# Patient Record
Sex: FEMALE | Race: BLACK OR AFRICAN AMERICAN | NOT HISPANIC OR LATINO | URBAN - METROPOLITAN AREA
[De-identification: names, ages, dates, MRNs, and addresses within clinical notes are randomized per-mention and may not be internally consistent; named-entity substitution may affect disease eponyms.]

---

## 2017-08-01 ENCOUNTER — EMERGENCY (EMERGENCY)
Facility: HOSPITAL | Age: 57
LOS: 1 days | Discharge: HOME | End: 2017-08-01
Attending: EMERGENCY MEDICINE | Admitting: EMERGENCY MEDICINE
Payer: SELF-PAY

## 2017-08-01 VITALS
TEMPERATURE: 100 F | SYSTOLIC BLOOD PRESSURE: 160 MMHG | RESPIRATION RATE: 18 BRPM | WEIGHT: 190.04 LBS | OXYGEN SATURATION: 97 % | HEIGHT: 60 IN | HEART RATE: 82 BPM | DIASTOLIC BLOOD PRESSURE: 85 MMHG

## 2017-08-01 VITALS
HEART RATE: 78 BPM | OXYGEN SATURATION: 98 % | TEMPERATURE: 98 F | DIASTOLIC BLOOD PRESSURE: 75 MMHG | RESPIRATION RATE: 16 BRPM | SYSTOLIC BLOOD PRESSURE: 150 MMHG

## 2017-08-01 PROCEDURE — 99283 EMERGENCY DEPT VISIT LOW MDM: CPT | Mod: 25

## 2017-08-01 PROCEDURE — 16020 DRESS/DEBRID P-THICK BURN S: CPT

## 2017-08-01 RX ORDER — ACETAMINOPHEN 500 MG
650 TABLET ORAL ONCE
Qty: 0 | Refills: 0 | Status: COMPLETED | OUTPATIENT
Start: 2017-08-01 | End: 2017-08-01

## 2017-08-01 RX ADMIN — Medication 1 APPLICATION(S): at 11:40

## 2017-08-01 RX ADMIN — Medication 650 MILLIGRAM(S): at 11:40
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
## 2018-03-28 NOTE — ED ADULT NURSE NOTE - MUSCULOSKELETAL WDL
Full range of motion of upper and lower extremities, no joint tenderness/swelling.
1 person assist/verbal cues/nonverbal cues (demo/gestures)

## 2018-12-20 ENCOUNTER — EMERGENCY (EMERGENCY)
Facility: HOSPITAL | Age: 58
LOS: 1 days | Discharge: HOME | End: 2018-12-20
Attending: EMERGENCY MEDICINE | Admitting: EMERGENCY MEDICINE
Payer: SELF-PAY

## 2018-12-20 VITALS
HEART RATE: 78 BPM | RESPIRATION RATE: 16 BRPM | OXYGEN SATURATION: 95 % | TEMPERATURE: 98 F | DIASTOLIC BLOOD PRESSURE: 81 MMHG | SYSTOLIC BLOOD PRESSURE: 174 MMHG

## 2018-12-20 VITALS
SYSTOLIC BLOOD PRESSURE: 150 MMHG | RESPIRATION RATE: 16 BRPM | DIASTOLIC BLOOD PRESSURE: 92 MMHG | HEIGHT: 60 IN | WEIGHT: 220.02 LBS | HEART RATE: 78 BPM | TEMPERATURE: 98 F | OXYGEN SATURATION: 99 %

## 2018-12-20 PROCEDURE — 72100 X-RAY EXAM L-S SPINE 2/3 VWS: CPT | Mod: 26

## 2018-12-20 PROCEDURE — 72100 X-RAY EXAM L-S SPINE 2/3 VWS: CPT

## 2018-12-20 PROCEDURE — 96372 THER/PROPH/DIAG INJ SC/IM: CPT

## 2018-12-20 PROCEDURE — 99283 EMERGENCY DEPT VISIT LOW MDM: CPT

## 2018-12-20 PROCEDURE — 99283 EMERGENCY DEPT VISIT LOW MDM: CPT | Mod: 25

## 2018-12-20 RX ORDER — KETOROLAC TROMETHAMINE 30 MG/ML
1 SYRINGE (ML) INJECTION
Qty: 20 | Refills: 0 | OUTPATIENT
Start: 2018-12-20 | End: 2018-12-24

## 2018-12-20 RX ORDER — KETOROLAC TROMETHAMINE 30 MG/ML
30 SYRINGE (ML) INJECTION ONCE
Qty: 0 | Refills: 0 | Status: DISCONTINUED | OUTPATIENT
Start: 2018-12-20 | End: 2018-12-20

## 2018-12-20 RX ADMIN — Medication 30 MILLIGRAM(S): at 11:15

## 2018-12-20 RX ADMIN — Medication 30 MILLIGRAM(S): at 11:45

## 2018-12-20 NOTE — ED PROVIDER NOTE - OBJECTIVE STATEMENT
59 y/o F pt w/ no significant PMHx presents to the ED ambulatory c/o back pain radiating to L leg x 3 months. Pt denies hx of injury or fall. Reports pain increased with ambulation and standing. Pt denies bladder or bowel dysfunction, numbness, tingling or any other complaints at this time.

## 2021-05-24 NOTE — ED ADULT TRIAGE NOTE - AS HEIGHT TYPE
Pt called in, said Larry wouldn't let her reply, said she did go to  over the weekend but that the white around the cut was because she got it wet. She removed the bandage to let it air dry and it is looking much better. She will call back if any concerns arise.    stated

## 2022-02-08 NOTE — ED ADULT TRIAGE NOTE - HEIGHT IN CM
Education Record    Learner:  Patient    Disease / Diagnosis: Vit b12 deficiency    Barriers / Limitations:  None   Comments:    Method:  Brief focused   Comments:    General Topics:  Plan of care reviewed   Comments:    Outcome:  Shows understanding   Comments: 152.4

## 2022-07-03 ENCOUNTER — EMERGENCY (EMERGENCY)
Facility: HOSPITAL | Age: 62
LOS: 1 days | Discharge: HOME | End: 2022-07-03
Attending: STUDENT IN AN ORGANIZED HEALTH CARE EDUCATION/TRAINING PROGRAM | Admitting: STUDENT IN AN ORGANIZED HEALTH CARE EDUCATION/TRAINING PROGRAM
Payer: COMMERCIAL

## 2022-07-03 VITALS
SYSTOLIC BLOOD PRESSURE: 166 MMHG | DIASTOLIC BLOOD PRESSURE: 79 MMHG | HEART RATE: 76 BPM | OXYGEN SATURATION: 99 % | TEMPERATURE: 98 F | RESPIRATION RATE: 16 BRPM

## 2022-07-03 VITALS
SYSTOLIC BLOOD PRESSURE: 177 MMHG | WEIGHT: 195.11 LBS | RESPIRATION RATE: 18 BRPM | OXYGEN SATURATION: 97 % | HEIGHT: 60 IN | DIASTOLIC BLOOD PRESSURE: 81 MMHG | TEMPERATURE: 98 F | HEART RATE: 78 BPM

## 2022-07-03 PROCEDURE — 99285 EMERGENCY DEPT VISIT HI MDM: CPT | Mod: 25

## 2022-07-03 PROCEDURE — 90471 IMMUNIZATION ADMIN: CPT

## 2022-07-03 PROCEDURE — 90715 TDAP VACCINE 7 YRS/> IM: CPT

## 2022-07-03 PROCEDURE — 99283 EMERGENCY DEPT VISIT LOW MDM: CPT

## 2022-07-03 RX ORDER — TETANUS TOXOID, REDUCED DIPHTHERIA TOXOID AND ACELLULAR PERTUSSIS VACCINE, ADSORBED 5; 2.5; 8; 8; 2.5 [IU]/.5ML; [IU]/.5ML; UG/.5ML; UG/.5ML; UG/.5ML
0.5 SUSPENSION INTRAMUSCULAR ONCE
Refills: 0 | Status: COMPLETED | OUTPATIENT
Start: 2022-07-03 | End: 2022-07-03

## 2022-07-03 RX ADMIN — Medication 1 APPLICATION(S): at 21:22

## 2022-07-03 RX ADMIN — TETANUS TOXOID, REDUCED DIPHTHERIA TOXOID AND ACELLULAR PERTUSSIS VACCINE, ADSORBED 0.5 MILLILITER(S): 5; 2.5; 8; 8; 2.5 SUSPENSION INTRAMUSCULAR at 21:22
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
## 2022-07-03 NOTE — ED PROVIDER NOTE - CARE PROVIDER_API CALL
Kyree Mtz (DPM)  Foot Surgery; Podiatric Medicine  [address] [place] [address]  Phone: [phone]  Fax: [phone]  Follow Up Time: 1-3 Days

## 2022-07-03 NOTE — ED PROVIDER NOTE - PATIENT PORTAL LINK FT
You can access the FollowMyHealth Patient Portal offered by [facility] by registering at the following website: [email or web address]. By joining [email or web address]’s FollowMyHealth portal, you will also be able to view your health information using other applications (apps) compatible with our system.

## 2022-07-03 NOTE — ED PROVIDER NOTE - NS ED ATTENDING STATEMENT MOD
This was a shared visit with the CRISS. I reviewed and verified the documentation and independently performed the documented:
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
## 2022-07-03 NOTE — ED PROVIDER NOTE - NSFOLLOWUPINSTRUCTIONS_ED_ALL_ED_FT
Burn    A burn is an injury to your skin or the tissues under your skin usually caused by heat or caustic chemicals. In severe cases, a burn can damage the muscles and bones under the skin. There are three different degrees of burns: first (mild), second, and third (severe). Make sure to use any prescribed ointments as directed. If you were prescribed antibiotic medicine, take it as told by your health care provider. Do not stop using the antibiotic even if your condition improves. Follow up is available at the burn clinic.    SEEK IMMEDIATE MEDICAL CARE IF YOU HAVE ANY OF THE FOLLOWING SYMPTOMS: red streaks near the burn, severe pain, or fever. 1) Apply silvedine burn cream twice a day  2) Follow up with wound care clinic  3) Take motrin and tylenol for pain    Burn    A burn is an injury to your skin or the tissues under your skin usually caused by heat or caustic chemicals. In severe cases, a burn can damage the muscles and bones under the skin. There are three different degrees of burns: first (mild), second, and third (severe). Make sure to use any prescribed ointments as directed. If you were prescribed antibiotic medicine, take it as told by your health care provider. Do not stop using the antibiotic even if your condition improves. Follow up is available at the burn clinic.    SEEK IMMEDIATE MEDICAL CARE IF YOU HAVE ANY OF THE FOLLOWING SYMPTOMS: red streaks near the burn, severe pain, or fever.
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
## 2022-07-03 NOTE — ED PROVIDER NOTE - ATTENDING APP SHARED VISIT CONTRIBUTION OF CARE
This was a shared visit with CRISS. I reviewed and verified the documentation and independently performed the documented MDM.
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
## 2022-07-03 NOTE — ED ADULT NURSE NOTE - NSIMPLEMENTINTERV_GEN_ALL_ED
Implemented All Universal Safety Interventions:  [place] to call system. Call bell, personal items and telephone within reach. Instruct patient to call for assistance. Room bathroom lighting operational. Non-slip footwear when patient is off stretcher. Physically safe environment: no spills, clutter or unnecessary equipment. Stretcher in lowest position, wheels locked, appropriate side rails in place.

## 2022-07-03 NOTE — ED ADULT TRIAGE NOTE - TEMPERATURE IN CELSIUS (DEGREES C)
Discussion/Summary   Moderate to severe shoulder osteoarthritis, see orthopedic surgeon as recommendeo        Verified Results  XR SHOULDER RT 3V [id number] 11:58AM Oneida Kidd   [Feb 5, 2017 12:56PM HANG APODACA]  Moderate to severe shoulder osteoarthritis, see orthopedic surgeon as recommendeo     Test Name Result Flag Reference   XR SHOULDER RT 3V (Report)     Accession #    [id number]    EXAM: XR SHOULDER RT 3V    CLINICAL INDICATION: Right shoulder pain for one year    COMPARISON: 12/15/2015    FINDINGS:  There is no fracture or dislocation. There is severe narrowing of the glenohumeral joint with a   bone-on-bone appearance of the articular surfaces, glenoid remodeling, subchondral sclerosis/  lucency of the articular surfaces. There is moderate spurring in the inferior aspect of the   joint. There is irregularity in the superolateral humeral head suggesting enthesopathy. There is   mild spurring of the acromioclavicular joint. Bone mineral loss is suggested. There is moderate atherosclerotic calcification of the aortic arch. IMPRESSION:    1. Moderate to severe osteoarthritis of the right glenohumeral joint. 2. Mild degenerative change of the acromioclavicular joint. 3. Possible bone mineral loss, consider DEXA scan for further evaluation. 4. Atherosclerosis.     **** F I N A L ****    Transcribed By: NILSON   01/31/17 4:11 pm    Dictated By:      Harris Hinkle MD    Electronically Reviewed and Approved By:      Harris Hinkle MD 01/31/17 4:12 pm 36.8

## 2022-07-13 PROBLEM — Z00.00 ENCOUNTER FOR PREVENTIVE HEALTH EXAMINATION: Status: ACTIVE | Noted: 2022-07-13

## 2022-07-14 ENCOUNTER — OUTPATIENT (OUTPATIENT)
Dept: OUTPATIENT SERVICES | Facility: HOSPITAL | Age: 62
LOS: 1 days | Discharge: HOME | End: 2022-07-14
Payer: COMMERCIAL

## 2022-07-14 ENCOUNTER — APPOINTMENT (OUTPATIENT)
Dept: WOUND CARE | Facility: HOSPITAL | Age: 62
End: 2022-07-14

## 2022-07-14 VITALS
RESPIRATION RATE: 20 BRPM | SYSTOLIC BLOOD PRESSURE: 177 MMHG | DIASTOLIC BLOOD PRESSURE: 96 MMHG | TEMPERATURE: 98.7 F | HEART RATE: 86 BPM | OXYGEN SATURATION: 98 %

## 2022-07-14 DIAGNOSIS — Z98.890 OTHER SPECIFIED POSTPROCEDURAL STATES: ICD-10-CM

## 2022-07-14 DIAGNOSIS — T22.00XA BURN OF UNSPECIFIED DEGREE OF SHOULDER AND UPPER LIMB, EXCEPT WRIST AND HAND, UNSPECIFIED SITE, INITIAL ENCOUNTER: ICD-10-CM

## 2022-07-14 DIAGNOSIS — T22.211D BURN OF SECOND DEGREE OF RIGHT FOREARM, SUBSEQUENT ENCOUNTER: ICD-10-CM
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
## 2022-07-14 DIAGNOSIS — Z78.9 OTHER SPECIFIED HEALTH STATUS: ICD-10-CM

## 2022-07-14 DIAGNOSIS — T30.0 BURN OF UNSPECIFIED BODY REGION, UNSPECIFIED DEGREE: ICD-10-CM
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
## 2022-07-14 DIAGNOSIS — M25.562 PAIN IN LEFT KNEE: ICD-10-CM
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
## 2022-07-14 DIAGNOSIS — T31.0 BURNS INVOLVING LESS THAN 10% OF BODY SURFACE: ICD-10-CM

## 2022-07-14 PROCEDURE — G0463: CPT

## 2022-07-14 PROCEDURE — 99203 OFFICE O/P NEW LOW 30 MIN: CPT

## 2022-07-14 RX ORDER — SILVER SULFADIAZINE 10 MG/G
1 CREAM TOPICAL
Qty: 50 | Refills: 0 | Status: ACTIVE | COMMUNITY
Start: 2022-07-04

## 2022-07-28 ENCOUNTER — APPOINTMENT (OUTPATIENT)
Dept: WOUND CARE | Facility: HOSPITAL | Age: 62
End: 2022-07-28

## 2022-07-28 ENCOUNTER — OUTPATIENT (OUTPATIENT)
Dept: OUTPATIENT SERVICES | Facility: HOSPITAL | Age: 62
LOS: 1 days | Discharge: HOME | End: 2022-07-28
Payer: SELF-PAY

## 2022-07-28 VITALS
SYSTOLIC BLOOD PRESSURE: 187 MMHG | HEIGHT: 62 IN | DIASTOLIC BLOOD PRESSURE: 94 MMHG | RESPIRATION RATE: 20 BRPM | TEMPERATURE: 98.2 F | HEART RATE: 83 BPM | OXYGEN SATURATION: 96 %

## 2022-07-28 DIAGNOSIS — T31.0 BURNS INVOLVING LESS THAN 10% OF BODY SURFACE: ICD-10-CM

## 2022-07-28 DIAGNOSIS — T22.211D: ICD-10-CM

## 2022-07-28 DIAGNOSIS — T22.019A: ICD-10-CM

## 2022-07-28 PROCEDURE — 99213 OFFICE O/P EST LOW 20 MIN: CPT

## 2022-07-28 PROCEDURE — G0463: CPT

## 2022-07-30 DIAGNOSIS — T22.211D BURN OF SECOND DEGREE OF RIGHT FOREARM, SUBSEQUENT ENCOUNTER: ICD-10-CM
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
## 2022-07-30 DIAGNOSIS — T31.0 BURNS INVOLVING LESS THAN 10% OF BODY SURFACE: ICD-10-CM
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
## 2022-07-30 DIAGNOSIS — Z98.890 OTHER SPECIFIED POSTPROCEDURAL STATES: ICD-10-CM
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
## 2022-12-01 ENCOUNTER — EMERGENCY (EMERGENCY)
Facility: HOSPITAL | Age: 62
LOS: 1 days | Discharge: HOME | End: 2022-12-01
Attending: STUDENT IN AN ORGANIZED HEALTH CARE EDUCATION/TRAINING PROGRAM | Admitting: STUDENT IN AN ORGANIZED HEALTH CARE EDUCATION/TRAINING PROGRAM
Payer: COMMERCIAL

## 2022-12-01 VITALS
OXYGEN SATURATION: 99 % | HEART RATE: 78 BPM | RESPIRATION RATE: 16 BRPM | DIASTOLIC BLOOD PRESSURE: 88 MMHG | SYSTOLIC BLOOD PRESSURE: 160 MMHG | TEMPERATURE: 98 F

## 2022-12-01 VITALS
WEIGHT: 195.11 LBS | DIASTOLIC BLOOD PRESSURE: 103 MMHG | SYSTOLIC BLOOD PRESSURE: 195 MMHG | OXYGEN SATURATION: 98 % | RESPIRATION RATE: 16 BRPM | TEMPERATURE: 99 F | HEART RATE: 76 BPM

## 2022-12-01 LAB
ALBUMIN SERPL ELPH-MCNC: 3.3 G/DL — SIGNIFICANT CHANGE UP (ref 3.3–5)
ALP SERPL-CCNC: 57 U/L — SIGNIFICANT CHANGE UP (ref 40–120)
ALT FLD-CCNC: 28 U/L — SIGNIFICANT CHANGE UP (ref 12–78)
ANION GAP SERPL CALC-SCNC: 4 MMOL/L — LOW (ref 5–17)
ANISOCYTOSIS BLD QL: SIGNIFICANT CHANGE UP
AST SERPL-CCNC: 17 U/L — SIGNIFICANT CHANGE UP (ref 15–37)
BASOPHILS # BLD AUTO: 0.02 K/UL — SIGNIFICANT CHANGE UP (ref 0–0.2)
BASOPHILS NFR BLD AUTO: 0.4 % — SIGNIFICANT CHANGE UP (ref 0–2)
BILIRUB SERPL-MCNC: 0.3 MG/DL — SIGNIFICANT CHANGE UP (ref 0.2–1.2)
BUN SERPL-MCNC: 11 MG/DL — SIGNIFICANT CHANGE UP (ref 7–23)
CALCIUM SERPL-MCNC: 9.2 MG/DL — SIGNIFICANT CHANGE UP (ref 8.5–10.1)
CHLORIDE SERPL-SCNC: 110 MMOL/L — HIGH (ref 96–108)
CO2 SERPL-SCNC: 29 MMOL/L — SIGNIFICANT CHANGE UP (ref 22–31)
CREAT SERPL-MCNC: 0.78 MG/DL — SIGNIFICANT CHANGE UP (ref 0.5–1.3)
EGFR: 85 ML/MIN/1.73M2 — SIGNIFICANT CHANGE UP
ELLIPTOCYTES BLD QL SMEAR: SLIGHT — SIGNIFICANT CHANGE UP
EOSINOPHIL # BLD AUTO: 0.16 K/UL — SIGNIFICANT CHANGE UP (ref 0–0.5)
EOSINOPHIL NFR BLD AUTO: 2.9 % — SIGNIFICANT CHANGE UP (ref 0–6)
GLUCOSE SERPL-MCNC: 95 MG/DL — SIGNIFICANT CHANGE UP (ref 70–99)
HCT VFR BLD CALC: 41.7 % — SIGNIFICANT CHANGE UP (ref 34.5–45)
HGB BLD-MCNC: 13 G/DL — SIGNIFICANT CHANGE UP (ref 11.5–15.5)
HYPOCHROMIA BLD QL: SLIGHT — SIGNIFICANT CHANGE UP
IMM GRANULOCYTES NFR BLD AUTO: 0.2 % — SIGNIFICANT CHANGE UP (ref 0–0.9)
LYMPHOCYTES # BLD AUTO: 2.05 K/UL — SIGNIFICANT CHANGE UP (ref 1–3.3)
LYMPHOCYTES # BLD AUTO: 37.7 % — SIGNIFICANT CHANGE UP (ref 13–44)
MACROCYTES BLD QL: SLIGHT — SIGNIFICANT CHANGE UP
MANUAL SMEAR VERIFICATION: SIGNIFICANT CHANGE UP
MCHC RBC-ENTMCNC: 23 PG — LOW (ref 27–34)
MCHC RBC-ENTMCNC: 31.2 GM/DL — LOW (ref 32–36)
MCV RBC AUTO: 73.9 FL — LOW (ref 80–100)
MICROCYTES BLD QL: SLIGHT — SIGNIFICANT CHANGE UP
MONOCYTES # BLD AUTO: 0.58 K/UL — SIGNIFICANT CHANGE UP (ref 0–0.9)
MONOCYTES NFR BLD AUTO: 10.7 % — SIGNIFICANT CHANGE UP (ref 2–14)
NEUTROPHILS # BLD AUTO: 2.62 K/UL — SIGNIFICANT CHANGE UP (ref 1.8–7.4)
NEUTROPHILS NFR BLD AUTO: 48.1 % — SIGNIFICANT CHANGE UP (ref 43–77)
NRBC # BLD: 0 /100 WBCS — SIGNIFICANT CHANGE UP (ref 0–0)
OVALOCYTES BLD QL SMEAR: SLIGHT — SIGNIFICANT CHANGE UP
PLAT MORPH BLD: NORMAL — SIGNIFICANT CHANGE UP
PLATELET # BLD AUTO: 193 K/UL — SIGNIFICANT CHANGE UP (ref 150–400)
PLATELET CLUMP BLD QL SMEAR: ABNORMAL
POIKILOCYTOSIS BLD QL AUTO: SLIGHT — SIGNIFICANT CHANGE UP
POLYCHROMASIA BLD QL SMEAR: SLIGHT — SIGNIFICANT CHANGE UP
POTASSIUM SERPL-MCNC: 4.1 MMOL/L — SIGNIFICANT CHANGE UP (ref 3.5–5.3)
POTASSIUM SERPL-SCNC: 4.1 MMOL/L — SIGNIFICANT CHANGE UP (ref 3.5–5.3)
PROT SERPL-MCNC: 7.4 G/DL — SIGNIFICANT CHANGE UP (ref 6–8.3)
RBC # BLD: 5.64 M/UL — HIGH (ref 3.8–5.2)
RBC # FLD: 18.3 % — HIGH (ref 10.3–14.5)
RBC BLD AUTO: ABNORMAL
SODIUM SERPL-SCNC: 143 MMOL/L — SIGNIFICANT CHANGE UP (ref 135–145)
WBC # BLD: 5.44 K/UL — SIGNIFICANT CHANGE UP (ref 3.8–10.5)
WBC # FLD AUTO: 5.44 K/UL — SIGNIFICANT CHANGE UP (ref 3.8–10.5)

## 2022-12-01 PROCEDURE — 73552 X-RAY EXAM OF FEMUR 2/>: CPT

## 2022-12-01 PROCEDURE — 73590 X-RAY EXAM OF LOWER LEG: CPT

## 2022-12-01 PROCEDURE — 93970 EXTREMITY STUDY: CPT | Mod: 26

## 2022-12-01 PROCEDURE — 85025 COMPLETE CBC W/AUTO DIFF WBC: CPT

## 2022-12-01 PROCEDURE — 73521 X-RAY EXAM HIPS BI 2 VIEWS: CPT | Mod: 26

## 2022-12-01 PROCEDURE — 93970 EXTREMITY STUDY: CPT

## 2022-12-01 PROCEDURE — 93010 ELECTROCARDIOGRAM REPORT: CPT

## 2022-12-01 PROCEDURE — 99285 EMERGENCY DEPT VISIT HI MDM: CPT | Mod: 25

## 2022-12-01 PROCEDURE — 73552 X-RAY EXAM OF FEMUR 2/>: CPT | Mod: 26,LT,RT

## 2022-12-01 PROCEDURE — 36415 COLL VENOUS BLD VENIPUNCTURE: CPT

## 2022-12-01 PROCEDURE — 80053 COMPREHEN METABOLIC PANEL: CPT

## 2022-12-01 PROCEDURE — 99285 EMERGENCY DEPT VISIT HI MDM: CPT

## 2022-12-01 PROCEDURE — 73521 X-RAY EXAM HIPS BI 2 VIEWS: CPT

## 2022-12-01 PROCEDURE — 73590 X-RAY EXAM OF LOWER LEG: CPT | Mod: 26,LT,RT

## 2022-12-01 PROCEDURE — 93005 ELECTROCARDIOGRAM TRACING: CPT

## 2022-12-01 RX ORDER — ACETAMINOPHEN 500 MG
650 TABLET ORAL ONCE
Refills: 0 | Status: COMPLETED | OUTPATIENT
Start: 2022-12-01 | End: 2022-12-01

## 2022-12-01 RX ADMIN — Medication 650 MILLIGRAM(S): at 12:41

## 2022-12-01 RX ADMIN — Medication 650 MILLIGRAM(S): at 13:00

## 2022-12-01 NOTE — ED PROVIDER NOTE - PATIENT PORTAL LINK FT
You can access the FollowMyHealth Patient Portal offered by [facility] by registering at the following website: [email or web address]. By joining [employer]’s FollowMyHealth portal, you will also be able to view your health information using other applications (apps) compatible with our system.

## 2022-12-01 NOTE — ED ADULT NURSE NOTE - NSIMPLEMENTINTERV_GEN_ALL_ED
Implemented All Fall Risk Interventions:  [place] to call system. Call bell, personal items and telephone within reach. Instruct patient to call for assistance. Room bathroom lighting operational. Non-slip footwear when patient is off stretcher. Physically safe environment: no spills, clutter or unnecessary equipment. Stretcher in lowest position, wheels locked, appropriate side rails in place. Provide visual cue, wrist band, yellow gown, etc. Monitor gait and stability. Monitor for mental status changes and reorient to person, place, and time. Review medications for side effects contributing to fall risk. Reinforce activity limits and safety measures with patient and family.

## 2022-12-01 NOTE — ED ADULT NURSE NOTE - OBJECTIVE STATEMENT
62yr old female a&ox4 sitting up in stretcher, daughter at bedside, per patient has been experiencing edema to b/l lower extremity. Per daughter edema has been prevalent for some time, these past couple of days edema has been altering gait and causing some discomfort. Edema noted to extremity, positive sensation, + pulses, pt noted worsening pain to left lower leg. Pt speech clear, pt able to speak in full sentences without become sob, pt denies active chest pain at this time, denies fever, chills, n/v. Pt also noted to be hypertensive.

## 2022-12-01 NOTE — ED PROVIDER NOTE - PROVIDER TOKENS
PROVIDER:[TOKEN:[[id number]],FOLLOWUP:[1-3 Days]],PROVIDER:[TOKEN:[[id number]],FOLLOWUP:[4-6 Days]]

## 2022-12-01 NOTE — ED PROVIDER NOTE - NSFOLLOWUPINSTRUCTIONS_ED_ALL_ED_FT
1. TAKE ALL MEDICATIONS AS DIRECTED.    2. FOR PAIN OR FEVER YOU CAN TAKE IBUPROFEN (MOTRIN, ADVIL) OR ACETAMINOPHEN (TYLENOL) AS NEEDED, AS DIRECTED ON PACKAGING.  3. FOLLOW UP WITH YOUR PRIMARY DOCTOR WITHIN 5 DAYS AS DIRECTED.  4. IF YOU HAD LABS OR IMAGING DONE, YOU WERE GIVEN COPIES OF ALL LABS AND/OR IMAGING RESULTS FROM YOUR ER VISIT--PLEASE TAKE THEM WITH YOU TO YOUR FOLLOW UP APPOINTMENTS.  5. IF NEEDED, CALL PATIENT ACCESS SERVICES AT [phone] TO FIND A PRIMARY CARE PHYSICIAN.  OR CALL [phone] TO MAKE AN APPOINTMENT WITH THE CLINIC.  6. RETURN TO THE ER FOR ANY WORSENING SYMPTOMS OR CONCERNS.  Follow up with youru primary care doctor and orthopedics     Leg Cramps      Leg cramps occur when one or more muscles tighten and a person has no control over it (involuntary muscle contraction). Muscle cramps are most common in the calf muscles of the leg. They can occur during exercise or at rest. Leg cramps are painful, and they may last for a few seconds to a few minutes. Cramps may return several times before they finally stop.    Usually, leg cramps are not caused by a serious medical problem. In many cases, the cause is not known. Some common causes include:  •Excessive physical effort (overexertion), such as during intense exercise.      •Doing the same motion over and over.      •Staying in a certain position for a long period of time.      •Improper preparation, form, or technique while doing a sport or an activity.      •Dehydration.      •Injury.      •Side effects of certain medicines.    •Abnormally low levels of minerals in your blood (electrolytes), especially potassium and calcium. This could result from:  •Pregnancy.      •Taking diuretic medicines.          Follow these instructions at home:    Eating and drinking     •Drink enough fluid to keep your urine pale yellow. Staying hydrated may help prevent cramps.      •Eat a healthy diet that includes plenty of nutrients to help your muscles function. A healthy diet includes fruits and vegetables, lean protein, whole grains, and low-fat or nonfat dairy products.        Managing pain, stiffness, and swelling                   •Try massaging, stretching, and relaxing the affected muscle. Do this for several minutes at a time.    •If directed, put ice on areas that are sore or painful after a cramp. To do this:  •Put ice in a plastic bag.      •Place a towel between your skin and the bag.      •Leave the ice on for 20 minutes, 2–3 times a day.      •Remove the ice if your skin turns bright red. This is very important. If you cannot feel pain, heat, or cold, you have a greater risk of damage to the area.      •If directed, apply heat to muscles that are tense or tight. Do this before you exercise, or as often as told by your health care provider. Use the heat source that your health care provider recommends, such as a moist heat pack or a heating pad. To do this:  •Place a towel between your skin and the heat source.      •Leave the heat on for 20–30 minutes.       •Remove the heat if your skin turns bright red. This is especially important if you are unable to feel pain, heat, or cold. You may have a greater risk of getting burned.        •Try taking hot showers or baths to help relax tight muscles.      General instructions     •If you are having frequent leg cramps, avoid intense exercise for several days.      •Take over-the-counter and prescription medicines only as told by your health care provider.      •Keep all follow-up visits. This is important.        Contact a health care provider if:    •Your leg cramps get more severe or more frequent, or they do not improve over time.      •Your foot becomes cold, numb, or blue.        Summary    •Muscle cramps can develop in any muscle, but the most common place is in the calf muscles of the leg.      •Leg cramps are painful, and they may last for a few seconds to a few minutes.      •Usually, leg cramps are not caused by a serious medical problem. Often, the cause is not known.      •Stay hydrated, and take over-the-counter and prescription medicines only as told by your health care provider.      This information is not intended to replace advice given to you by your health care provider. Make sure you discuss any questions you have with your health care provider.      Document Revised: 05/05/2021 Document Reviewed: 05/05/2021    [employer] Patient Education © 2022 [employer]

## 2022-12-01 NOTE — ED PROVIDER NOTE - OBJECTIVE STATEMENT
62-year-old female with no past medical history presents with 1 year of bilateral leg pain worse in the left leg on the right leg.  Pain is worse when walking.  No fevers no chills no injury or trauma.  Has been taking Motrin and Tylenol intermittently for pain has been evaluated in other ERs for the same pain and was only given Tylenol.  Has not seen her primary doctor for this pain patient states she notices swelling in her joints.

## 2022-12-01 NOTE — ED PROVIDER NOTE - PHYSICAL EXAMINATION
Gen: Well appearing in NAD  Head: NC/AT  Neck: trachea midline  cv: rrr  Resp:  No distress, lungs clear  Ext: no deformities, no swellinfg redness, warmth, no decreased rom   Neuro:  A&O appears non focal  Skin:  Warm and dry as visualized  Psych:  Normal affect and mood

## 2022-12-01 NOTE — ED PROVIDER NOTE - CARE PROVIDER_API CALL
[place] [address]  Phone: [phone]  Fax: [phone]  Follow Up Time: 1-3 Days    Claudio Tiwari)  Anshu Brice  Physicians  [address] [place] [address]  Phone: [phone]  Fax: [phone]  Follow Up Time: 4-6 Days

## 2022-12-01 NOTE — ED PROVIDER NOTE - CLINICAL SUMMARY MEDICAL DECISION MAKING FREE TEXT BOX
62-year-old female with no past medical history presents with 1 year of bilateral leg pain worse in the left leg on the right leg.  Pain is worse when walking.  No fevers no chills no injury or trauma.  Has been taking Motrin and Tylenol intermittently for pain has been evaluated in other ERs for the same pain and was only given Tylenol.  Has not seen her primary doctor for this pain patient states she notices swelling in her joints. pt well appearing, from of joints, no warmth redness of swelling noted to joints, will check basic labs, xrays, US ro dvt, will need pcp fu

## 2023-05-24 ENCOUNTER — EMERGENCY (EMERGENCY)
Facility: HOSPITAL | Age: 63
LOS: 1 days | Discharge: HOME | End: 2023-05-24
Attending: STUDENT IN AN ORGANIZED HEALTH CARE EDUCATION/TRAINING PROGRAM | Admitting: STUDENT IN AN ORGANIZED HEALTH CARE EDUCATION/TRAINING PROGRAM
Payer: COMMERCIAL

## 2023-05-24 VITALS
OXYGEN SATURATION: 98 % | HEART RATE: 86 BPM | RESPIRATION RATE: 18 BRPM | DIASTOLIC BLOOD PRESSURE: 76 MMHG | SYSTOLIC BLOOD PRESSURE: 135 MMHG

## 2023-05-24 VITALS
HEART RATE: 70 BPM | DIASTOLIC BLOOD PRESSURE: 93 MMHG | SYSTOLIC BLOOD PRESSURE: 153 MMHG | RESPIRATION RATE: 18 BRPM | TEMPERATURE: 98 F | OXYGEN SATURATION: 96 %

## 2023-05-24 PROCEDURE — 73562 X-RAY EXAM OF KNEE 3: CPT

## 2023-05-24 PROCEDURE — 73502 X-RAY EXAM HIP UNI 2-3 VIEWS: CPT

## 2023-05-24 PROCEDURE — 73562 X-RAY EXAM OF KNEE 3: CPT | Mod: 26,LT

## 2023-05-24 PROCEDURE — 99284 EMERGENCY DEPT VISIT MOD MDM: CPT

## 2023-05-24 PROCEDURE — 73610 X-RAY EXAM OF ANKLE: CPT

## 2023-05-24 PROCEDURE — 73610 X-RAY EXAM OF ANKLE: CPT | Mod: 26,LT

## 2023-05-24 PROCEDURE — 99284 EMERGENCY DEPT VISIT MOD MDM: CPT | Mod: 25

## 2023-05-24 PROCEDURE — 73502 X-RAY EXAM HIP UNI 2-3 VIEWS: CPT | Mod: 26,LT

## 2023-05-24 RX ORDER — MELOXICAM 15 MG/1
1 TABLET ORAL
Qty: 20 | Refills: 0
Start: 2023-05-24 | End: 2023-06-12

## 2023-05-24 RX ORDER — IBUPROFEN 200 MG
600 TABLET ORAL ONCE
Refills: 0 | Status: COMPLETED | OUTPATIENT
Start: 2023-05-24 | End: 2023-05-24

## 2023-05-24 RX ORDER — LIDOCAINE 4 G/100G
1 CREAM TOPICAL ONCE
Refills: 0 | Status: COMPLETED | OUTPATIENT
Start: 2023-05-24 | End: 2023-05-24

## 2023-05-24 RX ADMIN — LIDOCAINE 1 PATCH: 4 CREAM TOPICAL at 10:53

## 2023-05-24 RX ADMIN — Medication 600 MILLIGRAM(S): at 10:53

## 2023-05-24 NOTE — ED PROVIDER NOTE - PROGRESS NOTE DETAILS
Patient stable.  Overall pain improved after lidocaine patch and Motrin.  Ace wrap applied to knee and ankle.  Cane provided for support.  Recommend rest, ice, compression, elevation, and support.  Referral to orthopedics provided pain continues or fails to improve

## 2023-05-24 NOTE — ED PROVIDER NOTE - CLINICAL SUMMARY MEDICAL DECISION MAKING FREE TEXT BOX
62-year-old female with history of hypertension and hyperlipidemia presents with left leg pain after trip and fall yesterday.  Patient was getting out of a tall rental car yesterday around 12:30 PM.  States she tripped and landed on her left leg twisting it.  Denies hitting head or LOC.  Does not take any blood thinners.  Denies headache, dizziness, confusion, neck pain, or back pain.  Patient reports diffuse left leg pain, more so her left knee.  Has been ambulatory with assistance, but reports difficulty ambulating on her own due to pain.  Has not take anything over-the-counter for pain or symptoms today.  Denies other injuries or complaints. no obv deformity, tender in left knee, will get xrays, rofracture, more likely sprain   PCP Billie Ibarra

## 2023-05-24 NOTE — ED PROVIDER NOTE - DIFFERENTIAL DIAGNOSIS
Differential Diagnosis Differentials include but not limited to sprain, strain, contusion, fracture.  Denies hitting head or LOC.  Do not suspect intracranial hemorrhage or skull fracture.  No vertebral tenderness to suggest vertebral fracture

## 2023-05-24 NOTE — ED PROVIDER NOTE - OBJECTIVE STATEMENT
62-year-old female with history of hypertension and hyperlipidemia presents with left leg pain after trip and fall yesterday.  Patient was getting out of a tall rental car yesterday around 12:30 PM.  States she tripped and landed on her left leg.  Denies hitting head or LOC.  Does not take any blood thinners.  Denies headache, dizziness, confusion, neck pain, or back pain.  Patient reports diffuse left leg pain, more so her left knee.  Has been ambulatory with assistance, but reports difficulty ambulating on her own due to pain.  Has not take anything over-the-counter for pain or symptoms today.  Denies other injuries or complaints.  PCP Billie Ibarra

## 2023-05-24 NOTE — ED PROVIDER NOTE - PATIENT PORTAL LINK FT
You can access the FollowMyHealth Patient Portal offered by [facility] by registering at the following website: [email or web address]. By joining [employer]’s FollowMyHealth portal, you will also be able to view your health information using other applications (apps) compatible with our system.

## 2023-05-24 NOTE — ED PROVIDER NOTE - CARE PLAN
1 Principal Discharge DX:	Knee sprain  Secondary Diagnosis:	Left ankle sprain  Secondary Diagnosis:	Sprain of left hip

## 2023-05-24 NOTE — ED PROCEDURE NOTE - CPROC ED TIME OUT STATEMENT1
Patient had a rash 10 days ago and was seen in Urgent Care. Now the rash is in her inner elbows and has enlarged since Friday and group home would like her to be seen today. Please contact Ernesto at [phone] to schedule time. If Dr. Solomon Ernandez cannot see her, they would take her to Urgent Care but would prefer to see Dr. Solomon Ernandez.
Patient scheduled for today 8/27
“Patient's name, , procedure and correct site were confirmed during the [place] Timeout.”

## 2023-05-24 NOTE — ED ADULT TRIAGE NOTE - CHIEF COMPLAINT QUOTE
c/o L leg pain s/p trip and fall yesterday. denies head trauma, dizziness, lightheadedness. no obvious injury noted to L leg at this time.

## 2023-05-24 NOTE — ED PROVIDER NOTE - NSFOLLOWUPINSTRUCTIONS_ED_ALL_ED_FT
Rest, ice, compression, elevation, and support  Meloxicam once a day with food  Follow-up with orthopedics if pain continues or fails to improve, referral provided if needed      Knee Sprain    A knee sprain is a stretch or tear in a knee ligament. Knee ligaments are tissues that connect bones in the knee to each other.    What are the causes?  This condition often results from:  A fall.  An injury to the knee.  What are the signs or symptoms?  Symptoms of this condition include:  Trouble straightening or bending the leg.  Swelling in the knee.  Bruising around the knee.  Tenderness or pain in the knee.  Sudden muscle tightening (spasms) around the knee.  How is this treated?  Treatment for this condition may involve:  Keeping the knee still (immobilized) with a cast, brace, or splint.  Putting ice on the knee. This helps with pain and swelling.  Raising (elevating) the knee above the level of your heart when you are resting. This helps with pain and swelling.  Taking medicine for pain.  Doing exercises to keep your knee from being weak or stiff.  Having surgery. This may be needed if the ligament is completely torn.  Follow these instructions at home:  If you have a splint or brace:    Wear it as told by your doctor. Remove it only as told by your doctor.  Check the skin around it every day. Tell your doctor about any concerns.  Loosen it if your toes:  Tingle.  Become numb.  Turn cold and blue.  Keep it clean and dry.  If you have a cast:    Do not stick anything inside it to scratch your skin.  Check the skin around it every day. Tell your doctor about any concerns.  You may put lotion on dry skin around the edges of the cast. Do not put lotion on the skin under the cast.  Keep it clean and dry.  Bathing    If you have a splint, brace, or cast that is not waterproof:  Do not let it get wet.  Cover it with a watertight covering when you take a bath or a shower.  Managing pain, stiffness, and swelling      If told, put ice on the injured area. To do this:  If you have a removable splint or brace, remove it as told by your doctor.  Put ice in a plastic bag.  Place a towel between your skin and the bag or between your cast and the bag.  Leave the ice on for 20 minutes, 2–3 times a day.  Move your toes often to reduce stiffness and swelling.  Raise the injured area above the level of your heart while you are sitting or lying down.  General instructions    Take over-the-counter and prescription medicines only as told by your doctor.  Do not use any products that contain nicotine or tobacco, such as cigarettes, e-cigarettes, and chewing tobacco. These can delay healing. If you need help quitting, ask your doctor.  Do exercises as told by your doctor.  Keep all follow-up visits as told by your doctor. This is important.  Contact a doctor if:  Your pain gets worse.  The cast, brace, or splint does not fit right.  The cast, brace, or splint gets damaged.  Get help right away if:  You cannot use your knee to support your body weight (bear weight) for standing or walking.  You cannot move the injured area.  Your knee cate or you have pain after you walk only a few steps.  You have very bad pain, swelling, or numbness in your leg below the cast, brace, or splint.  Your foot or toes are numb, cold, or blue after loosening your splint or brace.  Summary  A knee sprain is a stretch or tear in a tissue (ligament) that connects your knee bones to each other.  You may need to wear a splint, brace, or cast to keep the knee still while it is getting better.  Surgery may be needed if the ligament is completely torn.  This information is not intended to replace advice given to you by your health care provider. Make sure you discuss any questions you have with your health care provider.

## 2023-05-24 NOTE — ED PROVIDER NOTE - CARE PROVIDER_API CALL
Sarath Arevalo)  Anshu Brice  Physicians  [address] [place] [address], [address] [place] [address]  Phone: [phone]  Fax: [phone]  Follow Up Time: 1-3 Days

## 2023-05-24 NOTE — ED ADULT NURSE NOTE - NSFALLUNIVINTERV_ED_ALL_ED
Bed/Stretcher in lowest position, wheels locked, appropriate side rails in place/Call bell, personal items and telephone in reach/Instruct patient to call for assistance before getting out of bed/chair/stretcher/Non-slip footwear applied when patient is off stretcher/[place] to call system/Physically safe environment - no spills, clutter or unnecessary equipment/Purposeful proactive rounding/Room/bathroom lighting operational, light cord in reach

## 2023-05-24 NOTE — ED PROVIDER NOTE - MUSCULOSKELETAL, MLM
diffuse soft tissue tenderness to left leg including hip, knee, and ankle. no ext rotation or shortening. full ROM of all ext, pain with flexion and extension of left knee. achilles tendon intact. able to SLR. no tenderness to head of fibula or base of 5th metatarsal

## 2023-05-24 NOTE — ED ADULT NURSE NOTE - OBJECTIVE STATEMENT
62yr old female a&ox4 arrives to ED from home notes fall while attempting to get out of car. pt denies loc or thinners, notes leg pain but notes worsening right knee pain. pt denies chest pain or sob. no obvious trauma noted to extremity. will continue to monitor pt. Pearl PUENTE

## 2024-03-08 NOTE — ED ADULT NURSE NOTE - AREA
For information on Fall & Injury Prevention, visit: [email or web address] OR  [email or web address] OR  https://www.cdc.gov/steadi/patient.html medial

## 2024-09-23 NOTE — ED PROVIDER NOTE - CARE PLAN
Products Recommended: Neutrogena Detail Level: Detailed General Sunscreen Counseling: I recommended a broad spectrum sunscreen with a SPF of 30 or higher.  I explained that SPF 30 sunscreens block approximately 97 percent of the sun's harmful rays.  Sunscreens should be applied at least 15 minutes prior to expected sun exposure and then every 2 hours after that as long as sun exposure continues. If swimming or exercising sunscreen should be reapplied every 45 minutes to an hour after getting wet or sweating.  One ounce, or the equivalent of a shot glass full of sunscreen, is adequate to protect the skin not covered by a bathing suit. I also recommended a lip balm with a sunscreen as well. Sun protective clothing can be used in lieu of sunscreen but must be worn the entire time you are exposed to the sun's rays. Principal Discharge DX:	Acute right-sided low back pain with left-sided sciatica

## 2025-05-28 NOTE — ED PROVIDER NOTE - WR ORDER ID 3
Medical Week 3 Survey      Flowsheet Row Responses   [facility] patient discharged from? [facility]   Does the patient have one of the following disease processes/diagnoses(primary or secondary)? Other   Week 3 attempt successful? No   Unsuccessful attempts Attempt 2  [No answer.]            Cassy OLMOS - Registered Nurse   [id number]